# Patient Record
Sex: FEMALE | Race: WHITE | NOT HISPANIC OR LATINO | Employment: FULL TIME | URBAN - METROPOLITAN AREA
[De-identification: names, ages, dates, MRNs, and addresses within clinical notes are randomized per-mention and may not be internally consistent; named-entity substitution may affect disease eponyms.]

---

## 2019-01-07 ENCOUNTER — RECORDS - HEALTHEAST (OUTPATIENT)
Dept: LAB | Facility: HOSPITAL | Age: 24
End: 2019-01-07

## 2019-01-07 LAB
ANION GAP SERPL CALCULATED.3IONS-SCNC: 6 MMOL/L (ref 5–18)
CHLORIDE BLD-SCNC: 104 MMOL/L (ref 98–107)
CO2 SERPL-SCNC: 31 MMOL/L (ref 22–31)
LEVETIRACETAM (KEPPRA): 21.6 UG/ML (ref 6–46)
POTASSIUM BLD-SCNC: 3.9 MMOL/L (ref 3.5–5)
SODIUM SERPL-SCNC: 141 MMOL/L (ref 136–145)

## 2020-01-30 ENCOUNTER — RECORDS - HEALTHEAST (OUTPATIENT)
Dept: LAB | Facility: HOSPITAL | Age: 25
End: 2020-01-30

## 2020-01-30 LAB
ANION GAP SERPL CALCULATED.3IONS-SCNC: 7 MMOL/L (ref 5–18)
CHLORIDE BLD-SCNC: 105 MMOL/L (ref 98–107)
CO2 SERPL-SCNC: 29 MMOL/L (ref 22–31)
LEVETIRACETAM (KEPPRA): 21.5 UG/ML (ref 6–46)
POTASSIUM BLD-SCNC: 4 MMOL/L (ref 3.5–5)
SODIUM SERPL-SCNC: 141 MMOL/L (ref 136–145)

## 2021-02-22 DIAGNOSIS — G40.B09 NONINTRACTABLE JUVENILE MYOCLONIC EPILEPSY WITHOUT STATUS EPILEPTICUS (H): Primary | ICD-10-CM

## 2021-02-22 PROBLEM — E34.8 PINEAL GLAND CYST: Status: ACTIVE | Noted: 2021-02-22

## 2021-02-22 RX ORDER — FOLIC ACID 1 MG/1
2 TABLET ORAL 2 TIMES DAILY
Qty: 120 TABLET | Refills: 0 | Status: SHIPPED | OUTPATIENT
Start: 2021-02-22 | End: 2021-03-04

## 2021-02-22 RX ORDER — LEVETIRACETAM 500 MG/1
500 TABLET ORAL 2 TIMES DAILY
Qty: 60 TABLET | Refills: 0 | Status: SHIPPED | OUTPATIENT
Start: 2021-02-22 | End: 2021-03-04

## 2021-02-22 NOTE — TELEPHONE ENCOUNTER
Refill request for Keppra and folic acid  Letter mailed to patient to schedule follow up  Medication T'd for review and signature  Heather Tsai CMA on 2/22/2021 at 9:26 AM

## 2021-02-22 NOTE — LETTER
2/22/2021        RE: Nina Acostaa  [address] [address]                Dear Nina,      We recently provided you with medication refills.  Many medications require routine follow-up with your doctor.    Your prescription(s) have been refilled for 30 days so you may have time for the above noted follow-up. Please call to schedule soon so we can assure you have an appointment before your next refills are needed. If you have already made a follow up appointment, please disregard this letter.           Sincerely,        John Paul Cotton MD  [facility]     (Formerly known as [facility])

## 2021-03-03 SDOH — HEALTH STABILITY: MENTAL HEALTH: HOW OFTEN DO YOU HAVE 6 OR MORE DRINKS ON ONE OCCASION?: NOT ASKED

## 2021-03-03 SDOH — HEALTH STABILITY: MENTAL HEALTH: HOW OFTEN DO YOU HAVE A DRINK CONTAINING ALCOHOL?: MONTHLY OR LESS

## 2021-03-03 SDOH — HEALTH STABILITY: MENTAL HEALTH: HOW MANY STANDARD DRINKS CONTAINING ALCOHOL DO YOU HAVE ON A TYPICAL DAY?: 1 OR 2

## 2021-03-04 ENCOUNTER — VIRTUAL VISIT (OUTPATIENT)
Dept: NEUROLOGY | Facility: CLINIC | Age: 26
End: 2021-03-04
Payer: COMMERCIAL

## 2021-03-04 VITALS — WEIGHT: 140 LBS | BODY MASS INDEX: 22.5 KG/M2 | HEIGHT: 66 IN

## 2021-03-04 DIAGNOSIS — G40.B09 NONINTRACTABLE JUVENILE MYOCLONIC EPILEPSY WITHOUT STATUS EPILEPTICUS (H): Primary | ICD-10-CM

## 2021-03-04 PROCEDURE — 99214 OFFICE O/P EST MOD 30 MIN: CPT | Mod: GT | Performed by: PSYCHIATRY & NEUROLOGY

## 2021-03-04 RX ORDER — LEVETIRACETAM 500 MG/1
500 TABLET ORAL 2 TIMES DAILY
Qty: 180 TABLET | Refills: 3 | Status: SHIPPED | OUTPATIENT
Start: 2021-03-04 | End: 2021-05-14

## 2021-03-04 RX ORDER — FOLIC ACID 1 MG/1
2 TABLET ORAL 2 TIMES DAILY
Qty: 360 TABLET | Refills: 3 | Status: SHIPPED | OUTPATIENT
Start: 2021-03-04 | End: 2022-01-21

## 2021-03-04 RX ORDER — LEVONORGESTREL AND ETHINYL ESTRADIOL 0.1-0.02MG
KIT ORAL
COMMUNITY
Start: 2021-02-19 | End: 2022-05-09

## 2021-03-04 ASSESSMENT — MIFFLIN-ST. JEOR: SCORE: 1391.79

## 2021-03-04 NOTE — LETTER
3/4/2021         RE: Nina Nam  [address] [facility] [address]        Dear Colleague,    Thank you for referring your patient, Nina Nam, to the [facility] [place]. Please see a copy of my visit note below.    NEUROLOGY FOLLOW UP VISIT  NOTE       [facility] NEUROLOGY [place]  [address] [place] [address]  Tel: [phone]  Fax: [phone]  [email or web address]     Nina Nam,  1995, MRN [id number]  PCP: Almaz Mims, [phone]  Date: 3/4/2021     ASSESSMENT & PLAN     Diagnosis code  1. 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  She denies any seizures since her last visit.  She occasionally gets myoclonic twitches if she is tired or wakes up in the morning.  She is continuing with Keppra and folic acid.  I refilled her prescriptions, gave her 90-day supply with 3 refills.  I am checking Keppra level and electrolyte panel.  She was counseled about the risk of congenital malformation in women of childbearing age.  Regular follow-up will be in 1 year    Thank you again for this referral, please feel free to contact me if you have any questions.    John Paul Cotton MD  [facility]  (Formerly, [facility]     HISTORY OF PRESENT ILLNESS     Patient is 25 years old female with history of juvenile myoclonic epilepsy, pineal cyst who returns for yearly follow-up.  Previously she was seen in the clinic by Dr. Burrows and is here for routine yearly follow-up.  She denies any seizures since her last visit.  There is no history of unexplained loss of consciousness, tonic-clonic activity or any myoclonic twitches.  She was initially evaluated in our clinic in  when she had a seizure after she woke  "up.  MRI of the brain was normal.  EEG did not show any abnormality.  She experienced some myoclonic twitches in the past but after she started taking Keppra those twitches have subsided.  Additionally she was started on folic acid as she is in childbearing age and takes 4 mg every day.  Since her last visit she reports no further episodes of seizures but does report that if she is tired she does get twitches in upper part of her body.  During the pandemic she has remained healthy.  Her sister is nurse and got her Covid injection and she is hopeful she will get hers soon.     PROBLEM LIST   Patient Active Problem List   Diagnosis Code     Juvenile myoclonic epilepsy (H) G40.B09     Pineal gland cyst E34.8         PAST MEDICAL & SURGICAL HISTORY     Past Medical History:   Patient  has no past medical history on file.    Surgical History:  She  has no past surgical history on file.     SOCIAL HISTORY     Reviewed, and she  reports that she has never smoked. She has never used smokeless tobacco. She reports current alcohol use.     FAMILY HISTORY     Reviewed, and family history includes Cancer in her maternal grandfather; Hypertension in her mother.     ALLERGIES     No Known Allergies      REVIEW OF SYSTEMS     A 12 point review of system was performed and was negative except as outlined in the history of present illness.     HOME MEDICATIONS       Current Outpatient Medications:      folic acid (FOLVITE) 1 MG tablet, Take 2 tablets (2 mg) by mouth 2 times daily, Disp: 360 tablet, Rfl: 3     levETIRAcetam (KEPPRA) 500 MG tablet, Take 1 tablet (500 mg) by mouth 2 times daily, Disp: 180 tablet, Rfl: 3     LESSINA-28 0.1-20 MG-MCG tablet, , Disp: , Rfl:       PHYSICAL EXAM     Vital signs  Ht 1.676 m (5' 6\")   Wt 63.5 kg (140 lb)   BMI 22.60 kg/m      Weight:   140 lbs 0 oz    Patient is alert and oriented x3 no acute distress.  Vital signs are reviewed and are documented in electronic medical record.   neck supple.  " Speech normal with no dysarthria or aphasia.  Extraocular movements are intact face symmetrical.  Patient moves all 4 extremities.  Rest of exam was not possible on a video visit     DIAGNOSTIC STUDIES     PERTINENT RADIOLOGY  Following imaging studies were reviewed:     (06/16/2015 16:12 CDT MRI Report)  1. Normal Head MRI.  2. No acute infarct, mass, or hemorrhage.  3. Incidental 14 mm pineal cyst    EEG 6/30/15  IMPRESSION: This is a normal awake and drowsy EEG.       PERTINENT LABS  Following labs were reviewed:  Result Name Current Result Reference Range   Sodium Level (mmol/L)  141 1/30/2020 136 - 145   Potassium Level (mmol/L)  4.0 1/30/2020 3.5 - 5.0   CO2 Level (mmol/L)  29 1/30/2020 22 - 31   Chloride Level (mmol/L)  105 1/30/2020 98 - 107   AGAP (mmol/L)  7 1/30/2020 5 - 18   Levetiracetam (Keppra) Level (ug/mL)  21.5 1/30/2020 6.0 - 46.0         VIDEO VISIT DETAILS  Patient is being evaluated via a billable video visit.   Patient would like the video invitation sent by: [x]E-mail  []Social Recruiting   Type of service: Video Visit  Video Start Time: 9:21 AM  Video End Time (time video stopped): 9:27 AM  Originating Location (Patient Location): Patient's Home  Distant Location (provider location): [facility] [place]   Mode of Communication: Video Conference via [x]VMO Systems []Doximity     Total time spent for face to face visit, reviewing labs/imaging studies, counseling and coordination of care was: 30 Minutes spent on the date of the encounter doing chart review, review of outside records, review of test results, interpretation of tests, patient visit and documentation       This note was dictated using voice recognition software.  Any grammatical or context distortions are unintentional and inherent to the software.             Again, thank you for allowing me to participate in the care of your patient.        Sincerely,        John Paul Cotton MD

## 2021-03-04 NOTE — PROGRESS NOTES
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  She denies any seizures since her last visit.  She occasionally gets myoclonic twitches if she is tired or wakes up in the morning.  She is continuing with Keppra and folic acid.  I refilled her prescriptions, gave her 90-day supply with 3 refills.  I am checking Keppra level and electrolyte panel.  She was counseled about the risk of congenital malformation in women of childbearing age.  Regular follow-up will be in 1 year    Thank you again for this referral, please feel free to contact me if you have any questions.    John Paul Cotton MD  [facility]  (Formerly, [facility])     HISTORY OF PRESENT ILLNESS     Patient is 25 years old female with history of juvenile myoclonic epilepsy, pineal cyst who returns for yearly follow-up.  Previously she was seen in the clinic by Dr. Burrows and is here for routine yearly follow-up.  She denies any seizures since her last visit.  There is no history of unexplained loss of consciousness, tonic-clonic activity or any myoclonic twitches.  She was initially evaluated in our clinic in  when she had a seizure after she woke up.  MRI of the brain was normal.  EEG did not show any abnormality.  She experienced some myoclonic twitches in the past but after she started taking Keppra those twitches have subsided.  Additionally she was started on folic acid as she is in childbearing  "age and takes 4 mg every day.  Since her last visit she reports no further episodes of seizures but does report that if she is tired she does get twitches in upper part of her body.  During the pandemic she has remained healthy.  Her sister is nurse and got her Covid injection and she is hopeful she will get hers soon.     PROBLEM LIST   Patient Active Problem List   Diagnosis Code     Juvenile myoclonic epilepsy (H) G40.B09     Pineal gland cyst E34.8         PAST MEDICAL & SURGICAL HISTORY     Past Medical History:   Patient  has no past medical history on file.    Surgical History:  She  has no past surgical history on file.     SOCIAL HISTORY     Reviewed, and she  reports that she has never smoked. She has never used smokeless tobacco. She reports current alcohol use.     FAMILY HISTORY     Reviewed, and family history includes Cancer in her maternal grandfather; Hypertension in her mother.     ALLERGIES     No Known Allergies      REVIEW OF SYSTEMS     A 12 point review of system was performed and was negative except as outlined in the history of present illness.     HOME MEDICATIONS       Current Outpatient Medications:      folic acid (FOLVITE) 1 MG tablet, Take 2 tablets (2 mg) by mouth 2 times daily, Disp: 360 tablet, Rfl: 3     levETIRAcetam (KEPPRA) 500 MG tablet, Take 1 tablet (500 mg) by mouth 2 times daily, Disp: 180 tablet, Rfl: 3     LESSINA-28 0.1-20 MG-MCG tablet, , Disp: , Rfl:       PHYSICAL EXAM     Vital signs  Ht 1.676 m (5' 6\")   Wt 63.5 kg (140 lb)   BMI 22.60 kg/m      Weight:   140 lbs 0 oz    Patient is alert and oriented x3 no acute distress.  Vital signs are reviewed and are documented in electronic medical record.   neck supple.  Speech normal with no dysarthria or aphasia.  Extraocular movements are intact face symmetrical.  Patient moves all 4 extremities.  Rest of exam was not possible on a video visit     DIAGNOSTIC STUDIES     PERTINENT RADIOLOGY  Following imaging studies were " reviewed:     (06/16/2015 16:12 CDT MRI Report)  1. Normal Head MRI.  2. No acute infarct, mass, or hemorrhage.  3. Incidental 14 mm pineal cyst    EEG 6/30/15  IMPRESSION: This is a normal awake and drowsy EEG.       PERTINENT LABS  Following labs were reviewed:  Result Name Current Result Reference Range   Sodium Level (mmol/L)  141 1/30/2020 136 - 145   Potassium Level (mmol/L)  4.0 1/30/2020 3.5 - 5.0   CO2 Level (mmol/L)  29 1/30/2020 22 - 31   Chloride Level (mmol/L)  105 1/30/2020 98 - 107   AGAP (mmol/L)  7 1/30/2020 5 - 18   Levetiracetam (Keppra) Level (ug/mL)  21.5 1/30/2020 6.0 - 46.0         VIDEO VISIT DETAILS  Patient is being evaluated via a billable video visit.   Patient would like the video invitation sent by: [x]E-mail  []SMS   Type of service: Video Visit  Video Start Time: 9:21 AM  Video End Time (time video stopped): 9:27 AM  Originating Location (Patient Location): Patient's Home  Distant Location (provider location): [facility]rology [place]   Mode of Communication: Video Conference via [x]Flotype []Doximity     Total time spent for face to face visit, reviewing labs/imaging studies, counseling and coordination of care was: 30 Minutes spent on the date of the encounter doing chart review, review of outside records, review of test results, interpretation of tests, patient visit and documentation     This note was dictated using voice recognition software.  Any grammatical or context distortions are unintentional and inherent to the software.

## 2021-03-04 NOTE — NURSING NOTE
Chief Complaint   Patient presents with     Seizures     Annual follow up     Video Visit     Nya Tsai CMA on 3/4/2021 at 9:09 AM

## 2021-03-07 ENCOUNTER — HEALTH MAINTENANCE LETTER (OUTPATIENT)
Age: 26
End: 2021-03-07

## 2021-03-08 ENCOUNTER — TELEPHONE (OUTPATIENT)
Dept: NEUROLOGY | Facility: CLINIC | Age: 26
End: 2021-03-08

## 2021-05-14 DIAGNOSIS — G40.B09 NONINTRACTABLE JUVENILE MYOCLONIC EPILEPSY WITHOUT STATUS EPILEPTICUS (H): ICD-10-CM

## 2021-05-14 RX ORDER — LEVETIRACETAM 500 MG/1
500 TABLET ORAL 2 TIMES DAILY
Qty: 4 TABLET | Refills: 0 | Status: SHIPPED | OUTPATIENT
Start: 2021-05-14 | End: 2022-01-21

## 2021-05-14 NOTE — TELEPHONE ENCOUNTER
Pt called, she is in [place] and forgot her meds. Please send in 4 tabs of the 500 mg Keppra. She takes 1 tab bid. Send this once, to [facility] in [place]. Call if questions. [phone]

## 2021-10-11 ENCOUNTER — HEALTH MAINTENANCE LETTER (OUTPATIENT)
Age: 26
End: 2021-10-11

## 2022-01-21 DIAGNOSIS — G40.B09 NONINTRACTABLE JUVENILE MYOCLONIC EPILEPSY WITHOUT STATUS EPILEPTICUS (H): ICD-10-CM

## 2022-01-21 RX ORDER — LEVETIRACETAM 500 MG/1
TABLET ORAL
Qty: 180 TABLET | Refills: 3 | Status: SHIPPED | OUTPATIENT
Start: 2022-01-21 | End: 2022-05-09

## 2022-01-21 RX ORDER — FOLIC ACID 1 MG/1
TABLET ORAL
Qty: 360 TABLET | Refills: 3 | Status: SHIPPED | OUTPATIENT
Start: 2022-01-21 | End: 2022-05-09

## 2022-01-21 NOTE — TELEPHONE ENCOUNTER
Refill request for folic acid and keppra. Pt last seen 3/4/21 and is due for follow up around 3/2022. Will send in refills to get pt through to this appt.     Peggy Stephens RN on 1/21/2022 at 8:40 AM

## 2022-03-27 ENCOUNTER — HEALTH MAINTENANCE LETTER (OUTPATIENT)
Age: 27
End: 2022-03-27

## 2022-05-09 ENCOUNTER — LAB (OUTPATIENT)
Dept: LAB | Facility: HOSPITAL | Age: 27
End: 2022-05-09
Payer: COMMERCIAL

## 2022-05-09 ENCOUNTER — OFFICE VISIT (OUTPATIENT)
Dept: NEUROLOGY | Facility: CLINIC | Age: 27
End: 2022-05-09
Payer: COMMERCIAL

## 2022-05-09 VITALS
DIASTOLIC BLOOD PRESSURE: 88 MMHG | SYSTOLIC BLOOD PRESSURE: 125 MMHG | WEIGHT: 151 LBS | HEART RATE: 80 BPM | BODY MASS INDEX: 23.7 KG/M2 | HEIGHT: 67 IN

## 2022-05-09 DIAGNOSIS — G40.B09 NONINTRACTABLE JUVENILE MYOCLONIC EPILEPSY WITHOUT STATUS EPILEPTICUS (H): ICD-10-CM

## 2022-05-09 DIAGNOSIS — G40.B09 NONINTRACTABLE JUVENILE MYOCLONIC EPILEPSY WITHOUT STATUS EPILEPTICUS (H): Primary | ICD-10-CM

## 2022-05-09 LAB
ANION GAP SERPL CALCULATED.3IONS-SCNC: 9 MMOL/L (ref 5–18)
BUN SERPL-MCNC: 11 MG/DL (ref 8–22)
CALCIUM SERPL-MCNC: 10 MG/DL (ref 8.5–10.5)
CHLORIDE BLD-SCNC: 101 MMOL/L (ref 98–107)
CO2 SERPL-SCNC: 27 MMOL/L (ref 22–31)
CREAT SERPL-MCNC: 0.72 MG/DL (ref 0.6–1.1)
GFR SERPL CREATININE-BSD FRML MDRD: >90 ML/MIN/1.73M2
GLUCOSE BLD-MCNC: 99 MG/DL (ref 70–125)
POTASSIUM BLD-SCNC: 4.2 MMOL/L (ref 3.5–5)
SODIUM SERPL-SCNC: 137 MMOL/L (ref 136–145)

## 2022-05-09 PROCEDURE — 80048 BASIC METABOLIC PNL TOTAL CA: CPT

## 2022-05-09 PROCEDURE — 36415 COLL VENOUS BLD VENIPUNCTURE: CPT

## 2022-05-09 PROCEDURE — 99214 OFFICE O/P EST MOD 30 MIN: CPT | Performed by: PSYCHIATRY & NEUROLOGY

## 2022-05-09 PROCEDURE — 80177 DRUG SCRN QUAN LEVETIRACETAM: CPT

## 2022-05-09 RX ORDER — LEVETIRACETAM 500 MG/1
500 TABLET ORAL 2 TIMES DAILY
Qty: 180 TABLET | Refills: 3 | Status: SHIPPED | OUTPATIENT
Start: 2022-05-09 | End: 2023-04-25

## 2022-05-09 RX ORDER — FOLIC ACID 1 MG/1
2 TABLET ORAL 2 TIMES DAILY
Qty: 360 TABLET | Refills: 3 | Status: SHIPPED | OUTPATIENT
Start: 2022-05-09 | End: 2023-04-25

## 2022-05-09 NOTE — NURSING NOTE
Chief Complaint   Patient presents with     Seizures     Annual follow up      Heather Tsai CMA on 5/9/2022 at 12:44 PM

## 2022-05-09 NOTE — PROGRESS NOTES
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  Her symptoms are well controlled on current dose of Keppra and I have recommended:    1.  Continue Keppra 500 mg twice daily.  Prescription was filled and I gave her 90-day supply with 3 refills  2.  Continue folic acid 2 mg twice daily  3.  Patient was counseled about the risk of congenital malformation in women of childbearing age.  4.  Follow-up will be in 1 year.    Thank you again for this referral, please feel free to contact me if you have any questions.    John Paul Cotton MD  [facility]  (Former[facility], [facility]     HISTORY OF PRESENT ILLNESS     Patient is a pleasant 26-year-old female with history of juvenile myoclonic epilepsy, pineal cyst who returns for yearly follow-up.  She was last seen on 3/4/2021 when she reported occasional myoclonic twitches when she is tired.  She was kept on Keppra and folic acid and had blood levels checked that were normal.  Since her last visit she reports no further seizures.  She occasionally gets myoclonic twitches when she is tired.  She denies unexplained loss of consciousness, automatism or losing touch with her surrounding.    Patient was initially evaluated in our clinic in  when she had a seizure when she woke up.  MRI of the brain was normal.  EEG did not show any abnormality.  She experienced some myoclonic twitches in the past but after she was started on Keppra  "those twitches subsided.  She was also started on folic acid.  Although EEG was normal she was diagnosed with juvenile myoclonic epilepsy and had remained symptom-free.     PROBLEM LIST   Patient Active Problem List   Diagnosis Code     Juvenile myoclonic epilepsy (H) G40.B09     Pineal gland cyst E34.8         PAST MEDICAL & SURGICAL HISTORY     Past Medical History:   Patient  has no past medical history on file.    Surgical History:  She  has no past surgical history on file.     SOCIAL HISTORY     Reviewed, and she  reports that she has never smoked. She has never used smokeless tobacco. She reports current alcohol use.     FAMILY HISTORY     Reviewed, and family history includes Cancer in her maternal grandfather; Hypertension in her mother; Uterine Cancer in her maternal grandmother.     ALLERGIES     No Known Allergies      REVIEW OF SYSTEMS     A 12 point review of system was performed and was negative except as outlined in the history of present illness.     HOME MEDICATIONS     Current Outpatient Rx   Medication Sig Dispense Refill     folic acid (FOLVITE) 1 MG tablet Take 2 tablets (2 mg) by mouth 2 times daily 360 tablet 3     levETIRAcetam (KEPPRA) 500 MG tablet Take 1 tablet (500 mg) by mouth 2 times daily 180 tablet 3         PHYSICAL EXAM     Vital signs  /88 (BP Location: Right arm, Patient Position: Sitting)   Pulse 80   Ht 1.702 m (5' 7\")   Wt 68.5 kg (151 lb)   BMI 23.65 kg/m      Weight:   151 lbs 0 oz    Patient is alert and oriented x4 in no acute distress. Vital signs were reviewed and are documented in electronic medical record. Neck was supple, no carotid bruits, thyromegaly, JVD, or lymphadenopathy was noted.   NEUROLOGY EXAM:    Patient s speech was normal with no aphasia or dysarthria. Mentation, and affect were also normal.     Funduscopic exam was normal, with normal cup to disc ratio. Cranial nerves II -XII were intact.     Patient had normal mass, tone and motor strength " was 5/5 in all extremities without pronator drift.     Sensation was intact to light touch, pinprick, and vibratory sensation.     Reflexes were 1+ symmetrical with downgoing toes.     No dysmetria noted on FNF or HKS. Romberg was negative.    Gait testing was normal. Able to walk on toes/heels. Tandem walk normal.     PERTINENT DIAGNOSTIC STUDIES     Following studies were reviewed:     (06/16/2015 16:12 CDT MRI Report)  1. Normal Head MRI.  2. No acute infarct, mass, or hemorrhage.  3. Incidental 14 mm pineal cyst    EEG 6/30/15  IMPRESSION: This is a normal awake and drowsy EEG.     PERTINENT LABS  Following labs were reviewed:  No visits with results within 3 Month(s) from this visit.   Latest known visit with results is:   Records - [facility] on 01/30/2020   Component Date Value     Levetiracetam 01/30/2020 21.5      Sodium 01/30/2020 141      Potassium 01/30/2020 4.0      Carbon Dioxide (CO2) 01/30/2020 29      Chloride 01/30/2020 105      Anion Gap 01/30/2020 7          Total time spent for face to face visit, reviewing labs/imaging studies, counseling and coordination of care was: 30 Minutes spent on the date of the encounter doing chart review, review of outside records, review of test results, interpretation of tests, patient visit and documentation     This note was dictated using voice recognition software.  Any grammatical or context distortions are unintentional and inherent to the software.    Orders Placed This Encounter   Procedures     Keppra (Levetiracetam) Level     Basic metabolic panel      New Prescriptions    No medications on file     Modified Medications    Modified Medication Previous Medication    FOLIC ACID (FOLVITE) 1 MG TABLET folic acid (FOLVITE) 1 MG tablet       Take 2 tablets (2 mg) by mouth 2 times daily    TAKE 2 TABLETS BY MOUTH  TWICE DAILY    LEVETIRACETAM (KEPPRA) 500 MG TABLET levETIRAcetam (KEPPRA) 500 MG tablet       Take 1 tablet (500 mg) by mouth 2 times daily    TAKE 1  TABLET BY MOUTH  TWICE DAILY

## 2022-05-09 NOTE — LETTER
2022         RE: Nina Nam  [address] [facility] [address]        Dear Colleague,    Thank you for referring your patient, Nina Nam, to the [facility]UROLOG[facility]IN[facility] [place]. Please see a copy of my visit note below.    NEUROLOGY FOLLOW UP VISIT  NOTE       [facility]UROLOG[facility] [place]  [address] [place] [address]  Tel: [phone]  Fax: [phone]  [email or web address]     Nina Nam,  1995, MRN [id number]  PCP: Almaz Mims  Date: 2022      ASSESSMENT & PLAN     Visit Diagnosis  1. 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  Her symptoms are well controlled on current dose of Keppra and I have recommended:    1.  Continue Keppra 500 mg twice daily.  Prescription was filled and I gave her 90-day supply with 3 refills  2.  Continue folic acid 2 mg twice daily  3.  Patient was counseled about the risk of congenital malformation in women of childbearing age.  4.  Follow-up will be in 1 year.    Thank you again for this referral, please feel free to contact me if you have any questions.    John Paul Cotton MD  [facility]  (Formerly, [facility]     HISTORY OF PRESENT ILLNESS     Patient is a pleasant 26-year-old female with history of juvenile myoclonic epilepsy, pineal cyst who returns for yearly follow-up.  She was last seen on 3/4/2021 when she reported occasional myoclonic twitches when she is tired.  She was kept on Keppra and folic acid and had blood levels checked that were normal.  Since her last visit she reports no further seizures.  She occasionally gets myoclonic twitches when she is tired.  She denies unexplained loss of consciousness, automatism or losing touch with her  "surrounding.    Patient was initially evaluated in our clinic in 2014 when she had a seizure when she woke up.  MRI of the brain was normal.  EEG did not show any abnormality.  She experienced some myoclonic twitches in the past but after she was started on Keppra those twitches subsided.  She was also started on folic acid.  Although EEG was normal she was diagnosed with juvenile myoclonic epilepsy and had remained symptom-free.     PROBLEM LIST   Patient Active Problem List   Diagnosis Code     Juvenile myoclonic epilepsy (H) G40.B09     Pineal gland cyst E34.8         PAST MEDICAL & SURGICAL HISTORY     Past Medical History:   Patient  has no past medical history on file.    Surgical History:  She  has no past surgical history on file.     SOCIAL HISTORY     Reviewed, and she  reports that she has never smoked. She has never used smokeless tobacco. She reports current alcohol use.     FAMILY HISTORY     Reviewed, and family history includes Cancer in her maternal grandfather; Hypertension in her mother; Uterine Cancer in her maternal grandmother.     ALLERGIES     No Known Allergies      REVIEW OF SYSTEMS     A 12 point review of system was performed and was negative except as outlined in the history of present illness.     HOME MEDICATIONS     Current Outpatient Rx   Medication Sig Dispense Refill     folic acid (FOLVITE) 1 MG tablet Take 2 tablets (2 mg) by mouth 2 times daily 360 tablet 3     levETIRAcetam (KEPPRA) 500 MG tablet Take 1 tablet (500 mg) by mouth 2 times daily 180 tablet 3         PHYSICAL EXAM     Vital signs  /88 (BP Location: Right arm, Patient Position: Sitting)   Pulse 80   Ht 1.702 m (5' 7\")   Wt 68.5 kg (151 lb)   BMI 23.65 kg/m      Weight:   151 lbs 0 oz    Patient is alert and oriented x4 in no acute distress. Vital signs were reviewed and are documented in electronic medical record. Neck was supple, no carotid bruits, thyromegaly, JVD, or lymphadenopathy was noted. "   NEUROLOGY EXAM:    Patient s speech was normal with no aphasia or dysarthria. Mentation, and affect were also normal.     Funduscopic exam was normal, with normal cup to disc ratio. Cranial nerves II -XII were intact.     Patient had normal mass, tone and motor strength was 5/5 in all extremities without pronator drift.     Sensation was intact to light touch, pinprick, and vibratory sensation.     Reflexes were 1+ symmetrical with downgoing toes.     No dysmetria noted on FNF or HKS. Romberg was negative.    Gait testing was normal. Able to walk on toes/heels. Tandem walk normal.     PERTINENT DIAGNOSTIC STUDIES     Following studies were reviewed:     (06/16/2015 16:12 CDT MRI Report)  1. Normal Head MRI.  2. No acute infarct, mass, or hemorrhage.  3. Incidental 14 mm pineal cyst    EEG 6/30/15  IMPRESSION: This is a normal awake and drowsy EEG.     PERTINENT LABS  Following labs were reviewed:  No visits with results within 3 Month(s) from this visit.   Latest known visit with results is:   Records - [facility] on 01/30/2020   Component Date Value     Levetiracetam 01/30/2020 21.5      Sodium 01/30/2020 141      Potassium 01/30/2020 4.0      Carbon Dioxide (CO2) 01/30/2020 29      Chloride 01/30/2020 105      Anion Gap 01/30/2020 7          Total time spent for face to face visit, reviewing labs/imaging studies, counseling and coordination of care was: 30 Minutes spent on the date of the encounter doing chart review, review of outside records, review of test results, interpretation of tests, patient visit and documentation       This note was dictated using voice recognition software.  Any grammatical or context distortions are unintentional and inherent to the software.    Orders Placed This Encounter   Procedures     Keppra (Levetiracetam) Level     Basic metabolic panel      New Prescriptions    No medications on file     Modified Medications    Modified Medication Previous Medication    FOLIC ACID (FOLVITE)  1 MG TABLET folic acid (FOLVITE) 1 MG tablet       Take 2 tablets (2 mg) by mouth 2 times daily    TAKE 2 TABLETS BY MOUTH  TWICE DAILY    LEVETIRACETAM (KEPPRA) 500 MG TABLET levETIRAcetam (KEPPRA) 500 MG tablet       Take 1 tablet (500 mg) by mouth 2 times daily    TAKE 1 TABLET BY MOUTH  TWICE DAILY                     Again, thank you for allowing me to participate in the care of your patient.        Sincerely,        John Paul Cotton MD

## 2022-05-11 LAB — LEVETIRACETAM SERPL-MCNC: 15 UG/ML

## 2022-09-19 ENCOUNTER — LAB (OUTPATIENT)
Dept: LAB | Facility: CLINIC | Age: 27
End: 2022-09-19
Payer: COMMERCIAL

## 2022-09-19 DIAGNOSIS — G40.B09 NONINTRACTABLE JUVENILE MYOCLONIC EPILEPSY WITHOUT STATUS EPILEPTICUS (H): ICD-10-CM

## 2022-09-19 LAB — LEVETIRACETAM SERPL-MCNC: 5.9 ΜG/ML (ref 10–40)

## 2022-09-19 PROCEDURE — 36415 COLL VENOUS BLD VENIPUNCTURE: CPT

## 2022-09-19 PROCEDURE — 80177 DRUG SCRN QUAN LEVETIRACETAM: CPT

## 2022-09-19 PROCEDURE — 80048 BASIC METABOLIC PNL TOTAL CA: CPT

## 2022-09-20 LAB
ANION GAP SERPL CALCULATED.3IONS-SCNC: 7 MMOL/L (ref 3–14)
BUN SERPL-MCNC: 6 MG/DL (ref 7–30)
CALCIUM SERPL-MCNC: 9.1 MG/DL (ref 8.5–10.1)
CHLORIDE BLD-SCNC: 105 MMOL/L (ref 94–109)
CO2 SERPL-SCNC: 24 MMOL/L (ref 20–32)
CREAT SERPL-MCNC: 0.48 MG/DL (ref 0.52–1.04)
GFR SERPL CREATININE-BSD FRML MDRD: >90 ML/MIN/1.73M2
GLUCOSE BLD-MCNC: 88 MG/DL (ref 70–99)
POTASSIUM BLD-SCNC: 3.8 MMOL/L (ref 3.4–5.3)
SODIUM SERPL-SCNC: 136 MMOL/L (ref 133–144)

## 2022-09-20 NOTE — RESULT ENCOUNTER NOTE
Dear Nina,   Although Keppra level is low, you have remained seizure-free and I would therefore not recommend making any change in the dose.  Please continue current dose follow-up as scheduled    John Paul Cotton MD

## 2022-09-25 ENCOUNTER — HEALTH MAINTENANCE LETTER (OUTPATIENT)
Age: 27
End: 2022-09-25

## 2022-09-27 NOTE — PROGRESS NOTES
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  Her delivery date is in 2023.  She recently had a Keppra level checked and although subtherapeutic I have not made any changes in the dose as she has remained symptom-free.  She also had ultrasound done at her OB/GYN office that was normal.  I have recommended:    1.  Continue Keppra 500 mg twice daily.  Although level is low I am not making any changes  2.  Continue folic acid 2 mg twice daily  3.  Repeat Keppra level after 3 months  4.  Follow-up after delivery in 2023    Thank you again for this referral, please feel free to contact me if you have any questions.    John Paul Cotton MD  [facility]  ([facility], [facility]     HISTORY OF PRESENT ILLNESS     Patient is a casie 26-year-old female with history of juvenile myoclonic epilepsy, pineal cyst last seen on 2022 who returns for follow-up sooner than her scheduled visit as she is 13 weeks pregnant.  She had her first ultrasound and OB appointment and everything was normal.  She was told to continue on folic acid and Keppra level was checked that was low but as she had remained seizure-free no change was made.  She denies unexplained loss of consciousness, myoclonic twitches or any seizures.  She is wondering after delivery if she can nursed the baby.    Patient was initially evaluated in our clinic in  when she had a seizure when she  "woke up.  MRI of the brain was normal.  EEG did not show any abnormality.  She experienced some myoclonic twitches in the past but after she was started on Keppra those twitches subsided.  She was also started on folic acid.  Although EEG was normal she was diagnosed with juvenile myoclonic epilepsy and had remained symptom-free.     PROBLEM LIST   Patient Active Problem List   Diagnosis Code     Juvenile myoclonic epilepsy (H) G40.B09     Pineal gland cyst E34.8         PAST MEDICAL & SURGICAL HISTORY     Past Medical History:   Patient  has no past medical history on file.    Surgical History:  She  has no past surgical history on file.     SOCIAL HISTORY     Reviewed, and she  reports that she has never smoked. She has never used smokeless tobacco. She reports current alcohol use.     FAMILY HISTORY     Reviewed, and family history includes Cancer in her maternal grandfather; Hypertension in her mother; Uterine Cancer in her maternal grandmother.     ALLERGIES     No Known Allergies      REVIEW OF SYSTEMS     A 12 point review of system was performed and was negative except as outlined in the history of present illness.     HOME MEDICATIONS     Current Outpatient Rx   Medication Sig Dispense Refill     folic acid (FOLVITE) 1 MG tablet Take 2 tablets (2 mg) by mouth 2 times daily 360 tablet 3     levETIRAcetam (KEPPRA) 500 MG tablet Take 1 tablet (500 mg) by mouth 2 times daily 180 tablet 3         PHYSICAL EXAM     Vital signs  /74 (BP Location: Right arm, Patient Position: Sitting)   Pulse 61   Ht 1.702 m (5' 7\")   Wt 69.9 kg (154 lb)   BMI 24.12 kg/m      Weight:   154 lbs 0 oz    Patient is alert and oriented x4 in no acute distress. Vital signs were reviewed and are documented in electronic medical record. Neck was supple, no carotid bruits, thyromegaly, JVD, or lymphadenopathy was noted.   NEUROLOGY EXAM:    Patient s speech was normal with no aphasia or dysarthria. Mentation, and affect were also " normal.     Funduscopic exam was normal, with normal cup to disc ratio. Cranial nerves II -XII were intact.     Patient had normal mass, tone and motor strength was 5/5 in all extremities without pronator drift.     Sensation was intact to light touch, pinprick, and vibratory sensation.     Reflexes were 1+ symmetrical with downgoing toes.     No dysmetria noted on FNF or HKS. Romberg was negative.    Gait testing was normal. Able to walk on toes/heels. Tandem walk normal.     PERTINENT DIAGNOSTIC STUDIES     Following studies were reviewed:     (06/16/2015 16:12 CDT MRI Report)  1. Normal Head MRI.  2. No acute infarct, mass, or hemorrhage.  3. Incidental 14 mm pineal cyst    EEG 6/30/15  IMPRESSION: This is a normal awake and drowsy EEG.     PERTINENT LABS  Following labs were reviewed:  Lab on 09/19/2022   Component Date Value     Keppra (Levetiracetam) L* 09/19/2022 5.9 (A)     Sodium 09/19/2022 136      Potassium 09/19/2022 3.8      Chloride 09/19/2022 105      Carbon Dioxide (CO2) 09/19/2022 24      Anion Gap 09/19/2022 7      Urea Nitrogen 09/19/2022 6 (A)     Creatinine 09/19/2022 0.48 (A)     Calcium 09/19/2022 9.1      Glucose 09/19/2022 88      GFR Estimate 09/19/2022 >90          Total time spent for face to face visit, reviewing labs/imaging studies, counseling and coordination of care was: 45 Minutes spent on the date of the encounter doing chart review, review of outside records, review of test results, interpretation of tests, patient visit and documentation     This note was dictated using voice recognition software.  Any grammatical or context distortions are unintentional and inherent to the software.    No orders of the defined types were placed in this encounter.     New Prescriptions    No medications on file     Modified Medications    No medications on file

## 2022-09-28 ENCOUNTER — OFFICE VISIT (OUTPATIENT)
Dept: NEUROLOGY | Facility: CLINIC | Age: 27
End: 2022-09-28
Payer: COMMERCIAL

## 2022-09-28 VITALS
BODY MASS INDEX: 24.17 KG/M2 | HEIGHT: 67 IN | SYSTOLIC BLOOD PRESSURE: 109 MMHG | DIASTOLIC BLOOD PRESSURE: 74 MMHG | WEIGHT: 154 LBS | HEART RATE: 61 BPM

## 2022-09-28 DIAGNOSIS — G40.B09 NONINTRACTABLE JUVENILE MYOCLONIC EPILEPSY WITHOUT STATUS EPILEPTICUS (H): Primary | ICD-10-CM

## 2022-09-28 PROCEDURE — 99215 OFFICE O/P EST HI 40 MIN: CPT | Performed by: PSYCHIATRY & NEUROLOGY

## 2022-09-28 NOTE — NURSING NOTE
Chief Complaint   Patient presents with     Seizures     Currently 17 weeks pregnant     Heather Tsai CMA on 9/28/2022 at 10:50 AM

## 2022-09-28 NOTE — LETTER
2022         RE: Nina Nam  [address] [facility] [address]        Dear Colleague,    Thank you for referring your patient, Nina Nam, to the [facility]UROLOG[facility] [place]. Please see a copy of my visit note below.    NEUROLOGY FOLLOW UP VISIT  NOTE       [facility] NEUROLOGY [place]  [address] [place] [address]  Tel: [phone]  Fax: [phone]  [email or web address]     Nina Nam,  1995, MRN [id number]  PCP: Almaz Mims  Date: 2022      ASSESSMENT & PLAN     Visit Diagnosis  1. 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  Her delivery date is in 2023.  She recently had a Keppra level checked and although subtherapeutic I have not made any changes in the dose as she has remained symptom-free.  She also had ultrasound done at her OB/GYN office that was normal.  I have recommended:    1.  Continue Keppra 500 mg twice daily.  Although level is low I am not making any changes  2.  Continue folic acid 2 mg twice daily  3.  Repeat Keppra level after 3 months  4.  Follow-up after delivery in 2023    Thank you again for this referral, please feel free to contact me if you have any questions.    John Paul Cotton MD  [facility]  ([facility], [facility]     HISTORY OF PRESENT ILLNESS     Patient is a pleasant 26-year-old female with history of juvenile myoclonic epilepsy, pineal cyst last seen on 2022 who returns for follow-up sooner than her scheduled visit as she is 13 weeks pregnant.  She had her first ultrasound and OB appointment and everything was normal.  She was told to continue on folic acid and Keppra level was checked that was low but as she had remained  "seizure-free no change was made.  She denies unexplained loss of consciousness, myoclonic twitches or any seizures.  She is wondering after delivery if she can nursed the baby.    Patient was initially evaluated in our clinic in 2014 when she had a seizure when she woke up.  MRI of the brain was normal.  EEG did not show any abnormality.  She experienced some myoclonic twitches in the past but after she was started on Keppra those twitches subsided.  She was also started on folic acid.  Although EEG was normal she was diagnosed with juvenile myoclonic epilepsy and had remained symptom-free.     PROBLEM LIST   Patient Active Problem List   Diagnosis Code     Juvenile myoclonic epilepsy (H) G40.B09     Pineal gland cyst E34.8         PAST MEDICAL & SURGICAL HISTORY     Past Medical History:   Patient  has no past medical history on file.    Surgical History:  She  has no past surgical history on file.     SOCIAL HISTORY     Reviewed, and she  reports that she has never smoked. She has never used smokeless tobacco. She reports current alcohol use.     FAMILY HISTORY     Reviewed, and family history includes Cancer in her maternal grandfather; Hypertension in her mother; Uterine Cancer in her maternal grandmother.     ALLERGIES     No Known Allergies      REVIEW OF SYSTEMS     A 12 point review of system was performed and was negative except as outlined in the history of present illness.     HOME MEDICATIONS     Current Outpatient Rx   Medication Sig Dispense Refill     folic acid (FOLVITE) 1 MG tablet Take 2 tablets (2 mg) by mouth 2 times daily 360 tablet 3     levETIRAcetam (KEPPRA) 500 MG tablet Take 1 tablet (500 mg) by mouth 2 times daily 180 tablet 3         PHYSICAL EXAM     Vital signs  /74 (BP Location: Right arm, Patient Position: Sitting)   Pulse 61   Ht 1.702 m (5' 7\")   Wt 69.9 kg (154 lb)   BMI 24.12 kg/m      Weight:   154 lbs 0 oz    Patient is alert and oriented x4 in no acute distress. Vital " signs were reviewed and are documented in electronic medical record. Neck was supple, no carotid bruits, thyromegaly, JVD, or lymphadenopathy was noted.   NEUROLOGY EXAM:    Patient s speech was normal with no aphasia or dysarthria. Mentation, and affect were also normal.     Funduscopic exam was normal, with normal cup to disc ratio. Cranial nerves II -XII were intact.     Patient had normal mass, tone and motor strength was 5/5 in all extremities without pronator drift.     Sensation was intact to light touch, pinprick, and vibratory sensation.     Reflexes were 1+ symmetrical with downgoing toes.     No dysmetria noted on FNF or HKS. Romberg was negative.    Gait testing was normal. Able to walk on toes/heels. Tandem walk normal.     PERTINENT DIAGNOSTIC STUDIES     Following studies were reviewed:     (06/16/2015 16:12 CDT MRI Report)  1. Normal Head MRI.  2. No acute infarct, mass, or hemorrhage.  3. Incidental 14 mm pineal cyst    EEG 6/30/15  IMPRESSION: This is a normal awake and drowsy EEG.     PERTINENT LABS  Following labs were reviewed:  Lab on 09/19/2022   Component Date Value     Keppra (Levetiracetam) L* 09/19/2022 5.9 (A)     Sodium 09/19/2022 136      Potassium 09/19/2022 3.8      Chloride 09/19/2022 105      Carbon Dioxide (CO2) 09/19/2022 24      Anion Gap 09/19/2022 7      Urea Nitrogen 09/19/2022 6 (A)     Creatinine 09/19/2022 0.48 (A)     Calcium 09/19/2022 9.1      Glucose 09/19/2022 88      GFR Estimate 09/19/2022 >90          Total time spent for face to face visit, reviewing labs/imaging studies, counseling and coordination of care was: 45 Minutes spent on the date of the encounter doing chart review, review of outside records, review of test results, interpretation of tests, patient visit and documentation       This note was dictated using voice recognition software.  Any grammatical or context distortions are unintentional and inherent to the software.    No orders of the defined types  were placed in this encounter.     New Prescriptions    No medications on file     Modified Medications    No medications on file                     Again, thank you for allowing me to participate in the care of your patient.        Sincerely,        John Paul Cotton MD

## 2022-12-16 ENCOUNTER — LAB (OUTPATIENT)
Dept: LAB | Facility: CLINIC | Age: 27
End: 2022-12-16
Payer: COMMERCIAL

## 2022-12-16 DIAGNOSIS — G40.B09 NONINTRACTABLE JUVENILE MYOCLONIC EPILEPSY WITHOUT STATUS EPILEPTICUS (H): ICD-10-CM

## 2022-12-16 LAB — LEVETIRACETAM SERPL-MCNC: 13.2 ΜG/ML (ref 10–40)

## 2022-12-16 PROCEDURE — 36415 COLL VENOUS BLD VENIPUNCTURE: CPT

## 2022-12-16 PROCEDURE — 80177 DRUG SCRN QUAN LEVETIRACETAM: CPT

## 2023-04-23 NOTE — PROGRESS NOTES
NEUROLOGY FOLLOW UP VISIT  NOTE       [facility] NEUROLOGY [place]  [address] [place] [address]  Tel: [phone]  Fax: [phone]  [email or web address]     Nina Nam,  1995, MRN [id number]  PCP: Almaz Mims  Date: 2023      ASSESSMENT & PLAN     Visit Diagnosis  1. 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 Pineal gland cyst     Juvenile myoclonic epilepsy  Pleasant 27-year-old female with juvenile myoclonic epilepsy, pineal cyst who returns for follow-up.  She denies any seizures since her last visit.  I have recommended:    1.  Continue Keppra 500 mg twice daily.  Prescriptions refilled for next year  2.  She was counseled about the risk of congenital malformation in women of childbearing age and also was continued on folic acid 2 mg twice daily  3.  Check Keppra level and electrolyte panel  4.  Part of work-up for her seizures included MRI brain that in the past showed a pineal cyst and I have recommended repeating MRI to rule out any change  5.  Follow-up in 1 year    Thank you again for this referral, please feel free to contact me if you have any questions.    John Paul Cotton MD  [facility]  (Former[facility], [facility]     HISTORY OF PRESENT ILLNESS     Patient is a 27-year-old female with history of juvenile myoclonic epilepsy, pineal cyst last seen on 2022 who returns for follow-up.  During her last visit she was pregnant and was scheduled to deliver in 2023.  Although her Keppra level was subtherapeutic I did not make any change in the dose of anticonvulsant.  She also had an ultrasound by her OB/GYN that was normal.  She was continued on Keppra and folic acid.  She denies any seizures since her last visit there is no history of unexplained loss of consciousness.    Patient was initially evaluated in our clinic in  when she had a seizure when she  "woke up.  MRI of the brain was normal.  EEG did not show any abnormality.  She experienced some myoclonic twitches in the past but after she was started on Keppra those twitches subsided.  She was also started on folic acid.  Although EEG was normal she was diagnosed with juvenile myoclonic epilepsy and had remained symptom-free.     PROBLEM LIST   Patient Active Problem List   Diagnosis Code     Juvenile myoclonic epilepsy (H) G40.B09     Pineal gland cyst E34.8         PAST MEDICAL & SURGICAL HISTORY     Past Medical History:   Patient  has no past medical history on file.    Surgical History:  She  has no past surgical history on file.     SOCIAL HISTORY     Reviewed, and she  reports that she has never smoked. She has never used smokeless tobacco. She reports current alcohol use.     FAMILY HISTORY     Reviewed, and family history includes Cancer in her maternal grandfather; Hypertension in her mother; Uterine Cancer in her maternal grandmother.     ALLERGIES     No Known Allergies      REVIEW OF SYSTEMS     A 12 point review of system was performed and was negative except as outlined in the history of present illness.     HOME MEDICATIONS     Current Outpatient Rx   Medication Sig Dispense Refill     folic acid (FOLVITE) 1 MG tablet Take 2 tablets (2 mg) by mouth 2 times daily 360 tablet 3     levETIRAcetam (KEPPRA) 500 MG tablet Take 1 tablet (500 mg) by mouth 2 times daily 180 tablet 3         PHYSICAL EXAM     Vital signs  /72 (BP Location: Right arm, Patient Position: Sitting)   Pulse 69   Ht 1.702 m (5' 7\")   Wt 70.8 kg (156 lb)   BMI 24.43 kg/m      Weight:   156 lbs 0 oz    Patient is alert and oriented x4 in no acute distress. Vital signs were reviewed and are documented in electronic medical record. Neck was supple, no carotid bruits, thyromegaly, JVD, or lymphadenopathy was noted.   NEUROLOGY EXAM:    Patient s speech was normal with no aphasia or dysarthria. Mentation, and affect were also " normal.     Funduscopic exam was normal, with normal cup to disc ratio. Cranial nerves II -XII were intact.     Patient had normal mass, tone and motor strength was 5/5 in all extremities without pronator drift.     Sensation was intact to light touch, pinprick, and vibratory sensation.     Reflexes were 1+ symmetrical with downgoing toes.     No dysmetria noted on FNF or HKS. Romberg was negative.    Gait testing was normal. Able to walk on toes/heels. Tandem walk normal.     PERTINENT DIAGNOSTIC STUDIES     Following studies were reviewed:     (06/16/2015 16:12 CDT MRI Report)  1. Normal Head MRI.  2. No acute infarct, mass, or hemorrhage.  3. Incidental 14 mm pineal cyst    EEG 6/30/15  This is a normal awake and drowsy EEG.     PERTINENT LABS  Following labs were reviewed:  No visits with results within 3 Month(s) from this visit.   Latest known visit with results is:   Lab on 12/16/2022   Component Date Value     Keppra (Levetiracetam) L* 12/16/2022 13.2          Total time spent for face to face visit, reviewing labs/imaging studies, counseling and coordination of care was: 30 Minutes spent on the date of the encounter doing chart review, review of outside records, review of test results, interpretation of tests, patient visit and documentation       This note was dictated using voice recognition software.  Any grammatical or context distortions are unintentional and inherent to the software.    Orders Placed This Encounter   Procedures     MR Brain w/o & w Contrast     Keppra (Levetiracetam) Level     Electrolyte panel      New Prescriptions    No medications on file     Modified Medications    Modified Medication Previous Medication    FOLIC ACID (FOLVITE) 1 MG TABLET folic acid (FOLVITE) 1 MG tablet       Take 2 tablets (2 mg) by mouth 2 times daily    Take 2 tablets (2 mg) by mouth 2 times daily    LEVETIRACETAM (KEPPRA) 500 MG TABLET levETIRAcetam (KEPPRA) 500 MG tablet       Take 1 tablet (500 mg) by  mouth 2 times daily    Take 1 tablet (500 mg) by mouth 2 times daily

## 2023-04-25 ENCOUNTER — OFFICE VISIT (OUTPATIENT)
Dept: NEUROLOGY | Facility: CLINIC | Age: 28
End: 2023-04-25
Payer: COMMERCIAL

## 2023-04-25 ENCOUNTER — LAB (OUTPATIENT)
Dept: LAB | Facility: HOSPITAL | Age: 28
End: 2023-04-25
Payer: COMMERCIAL

## 2023-04-25 VITALS
WEIGHT: 156 LBS | HEIGHT: 67 IN | SYSTOLIC BLOOD PRESSURE: 110 MMHG | HEART RATE: 69 BPM | DIASTOLIC BLOOD PRESSURE: 72 MMHG | BODY MASS INDEX: 24.48 KG/M2

## 2023-04-25 DIAGNOSIS — E34.8 PINEAL GLAND CYST: ICD-10-CM

## 2023-04-25 DIAGNOSIS — G40.B09 NONINTRACTABLE JUVENILE MYOCLONIC EPILEPSY WITHOUT STATUS EPILEPTICUS (H): ICD-10-CM

## 2023-04-25 DIAGNOSIS — G40.B09 NONINTRACTABLE JUVENILE MYOCLONIC EPILEPSY WITHOUT STATUS EPILEPTICUS (H): Primary | ICD-10-CM

## 2023-04-25 LAB
ANION GAP SERPL CALCULATED.3IONS-SCNC: 10 MMOL/L (ref 7–15)
CHLORIDE SERPL-SCNC: 103 MMOL/L (ref 98–107)
DEPRECATED HCO3 PLAS-SCNC: 27 MMOL/L (ref 22–29)
POTASSIUM SERPL-SCNC: 4.1 MMOL/L (ref 3.4–5.3)
SODIUM SERPL-SCNC: 140 MMOL/L (ref 136–145)

## 2023-04-25 PROCEDURE — 80051 ELECTROLYTE PANEL: CPT

## 2023-04-25 PROCEDURE — 36415 COLL VENOUS BLD VENIPUNCTURE: CPT

## 2023-04-25 PROCEDURE — 80177 DRUG SCRN QUAN LEVETIRACETAM: CPT

## 2023-04-25 PROCEDURE — 99214 OFFICE O/P EST MOD 30 MIN: CPT | Performed by: PSYCHIATRY & NEUROLOGY

## 2023-04-25 RX ORDER — FOLIC ACID 1 MG/1
2 TABLET ORAL 2 TIMES DAILY
Qty: 360 TABLET | Refills: 3 | Status: SHIPPED | OUTPATIENT
Start: 2023-04-25 | End: 2024-03-06

## 2023-04-25 RX ORDER — LEVETIRACETAM 500 MG/1
500 TABLET ORAL 2 TIMES DAILY
Qty: 180 TABLET | Refills: 3 | Status: SHIPPED | OUTPATIENT
Start: 2023-04-25 | End: 2024-03-06

## 2023-04-25 NOTE — NURSING NOTE
Chief Complaint   Patient presents with     Seizures     Annual follow up     Heather Tsai CMA on 4/25/2023 at 1:04 PM

## 2023-04-25 NOTE — LETTER
2023         RE: Nina Nam  [address] [facility] [address]        Dear Colleague,    Thank you for referring your patient, Nina Nam, to the [facility] NEUROLOG[facility]IC [place]. Please see a copy of my visit note below.    NEUROLOGY FOLLOW UP VISIT  NOTE       [facility] NEUROLOGY [place]  [address] [place] [address]  Tel: [phone]  Fax: [phone]  [email or web address]     Nina Nam,  1995, MRN [id number]  PCP: Almaz Mims  Date: 2023      ASSESSMENT & PLAN     Visit Diagnosis  1. 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 Pineal gland cyst     Juvenile myoclonic epilepsy  Pleasant 27-year-old female with juvenile myoclonic epilepsy, pineal cyst who returns for follow-up.  She denies any seizures since her last visit.  I have recommended:    1.  Continue Keppra 500 mg twice daily.  Prescriptions refilled for next year  2.  She was counseled about the risk of congenital malformation in women of childbearing age and also was continued on folic acid 2 mg twice daily  3.  Check Keppra level and electrolyte panel  4.  Part of work-up for her seizures included MRI brain that in the past showed a pineal cyst and I have recommended repeating MRI to rule out any change  5.  Follow-up in 1 year    Thank you again for this referral, please feel free to contact me if you have any questions.    John Paul Cotton MD  [facility]  ([facility], [facility]     HISTORY OF PRESENT ILLNESS     Patient is a 27-year-old female with history of juvenile myoclonic epilepsy, pineal cyst last seen on 2022 who returns for follow-up.  During her last visit she was pregnant and was scheduled to deliver in 2023.  Although her Keppra level was subtherapeutic I did not make any change in the dose of anticonvulsant.  She also had an ultrasound by her OB/GYN  "that was normal.  She was continued on Keppra and folic acid.  She denies any seizures since her last visit there is no history of unexplained loss of consciousness.    Patient was initially evaluated in our clinic in 2014 when she had a seizure when she woke up.  MRI of the brain was normal.  EEG did not show any abnormality.  She experienced some myoclonic twitches in the past but after she was started on Keppra those twitches subsided.  She was also started on folic acid.  Although EEG was normal she was diagnosed with juvenile myoclonic epilepsy and had remained symptom-free.     PROBLEM LIST   Patient Active Problem List   Diagnosis Code     Juvenile myoclonic epilepsy (H) G40.B09     Pineal gland cyst E34.8         PAST MEDICAL & SURGICAL HISTORY     Past Medical History:   Patient  has no past medical history on file.    Surgical History:  She  has no past surgical history on file.     SOCIAL HISTORY     Reviewed, and she  reports that she has never smoked. She has never used smokeless tobacco. She reports current alcohol use.     FAMILY HISTORY     Reviewed, and family history includes Cancer in her maternal grandfather; Hypertension in her mother; Uterine Cancer in her maternal grandmother.     ALLERGIES     No Known Allergies      REVIEW OF SYSTEMS     A 12 point review of system was performed and was negative except as outlined in the history of present illness.     HOME MEDICATIONS     Current Outpatient Rx   Medication Sig Dispense Refill     folic acid (FOLVITE) 1 MG tablet Take 2 tablets (2 mg) by mouth 2 times daily 360 tablet 3     levETIRAcetam (KEPPRA) 500 MG tablet Take 1 tablet (500 mg) by mouth 2 times daily 180 tablet 3         PHYSICAL EXAM     Vital signs  /72 (BP Location: Right arm, Patient Position: Sitting)   Pulse 69   Ht 1.702 m (5' 7\")   Wt 70.8 kg (156 lb)   BMI 24.43 kg/m      Weight:   156 lbs 0 oz    Patient is alert and oriented x4 in no acute distress. Vital signs were " reviewed and are documented in electronic medical record. Neck was supple, no carotid bruits, thyromegaly, JVD, or lymphadenopathy was noted.   NEUROLOGY EXAM:    Patient s speech was normal with no aphasia or dysarthria. Mentation, and affect were also normal.     Funduscopic exam was normal, with normal cup to disc ratio. Cranial nerves II -XII were intact.     Patient had normal mass, tone and motor strength was 5/5 in all extremities without pronator drift.     Sensation was intact to light touch, pinprick, and vibratory sensation.     Reflexes were 1+ symmetrical with downgoing toes.     No dysmetria noted on FNF or HKS. Romberg was negative.    Gait testing was normal. Able to walk on toes/heels. Tandem walk normal.     PERTINENT DIAGNOSTIC STUDIES     Following studies were reviewed:     (06/16/2015 16:12 CDT MRI Report)  1. Normal Head MRI.  2. No acute infarct, mass, or hemorrhage.  3. Incidental 14 mm pineal cyst    EEG 6/30/15  This is a normal awake and drowsy EEG.     PERTINENT LABS  Following labs were reviewed:  No visits with results within 3 Month(s) from this visit.   Latest known visit with results is:   Lab on 12/16/2022   Component Date Value     Keppra (Levetiracetam) L* 12/16/2022 13.2          Total time spent for face to face visit, reviewing labs/imaging studies, counseling and coordination of care was: 30 Minutes spent on the date of the encounter doing chart review, review of outside records, review of test results, interpretation of tests, patient visit and documentation       This note was dictated using voice recognition software.  Any grammatical or context distortions are unintentional and inherent to the software.    Orders Placed This Encounter   Procedures     MR Brain w/o & w Contrast     Keppra (Levetiracetam) Level     Electrolyte panel      New Prescriptions    No medications on file     Modified Medications    Modified Medication Previous Medication    FOLIC ACID (FOLVITE) 1  MG TABLET folic acid (FOLVITE) 1 MG tablet       Take 2 tablets (2 mg) by mouth 2 times daily    Take 2 tablets (2 mg) by mouth 2 times daily    LEVETIRACETAM (KEPPRA) 500 MG TABLET levETIRAcetam (KEPPRA) 500 MG tablet       Take 1 tablet (500 mg) by mouth 2 times daily    Take 1 tablet (500 mg) by mouth 2 times daily                     Again, thank you for allowing me to participate in the care of your patient.        Sincerely,        John Paul Cotton MD

## 2023-04-26 LAB — LEVETIRACETAM SERPL-MCNC: 13.2 ΜG/ML (ref 10–40)

## 2023-05-09 ENCOUNTER — HOSPITAL ENCOUNTER (OUTPATIENT)
Dept: MRI IMAGING | Facility: HOSPITAL | Age: 28
Discharge: HOME | End: 2023-05-09
Attending: PSYCHIATRY & NEUROLOGY | Admitting: PSYCHIATRY & NEUROLOGY
Payer: COMMERCIAL

## 2023-05-09 DIAGNOSIS — E34.8 PINEAL GLAND CYST: ICD-10-CM

## 2023-05-09 DIAGNOSIS — G40.B09 NONINTRACTABLE JUVENILE MYOCLONIC EPILEPSY WITHOUT STATUS EPILEPTICUS (H): ICD-10-CM

## 2023-05-09 PROCEDURE — 70553 MRI BRAIN STEM W/O & W/DYE: CPT

## 2023-05-09 PROCEDURE — 255N000002 HC RX 255 OP 636: Performed by: PSYCHIATRY & NEUROLOGY

## 2023-05-09 PROCEDURE — A9585 GADOBUTROL INJECTION: HCPCS | Performed by: PSYCHIATRY & NEUROLOGY

## 2023-05-09 RX ORDER — GADOBUTROL 604.72 MG/ML
0.1 INJECTION INTRAVENOUS ONCE
Status: COMPLETED | OUTPATIENT
Start: 2023-05-09 | End: 2023-05-09

## 2023-05-09 RX ADMIN — GADOBUTROL 7 ML: 604.72 INJECTION INTRAVENOUS at 11:09

## 2023-10-14 ENCOUNTER — HEALTH MAINTENANCE LETTER (OUTPATIENT)
Age: 28
End: 2023-10-14

## 2024-02-03 ENCOUNTER — OFFICE VISIT (OUTPATIENT)
Dept: URGENT CARE | Facility: URGENT CARE | Age: 29
End: 2024-02-03
Payer: COMMERCIAL

## 2024-02-03 VITALS
DIASTOLIC BLOOD PRESSURE: 81 MMHG | HEART RATE: 79 BPM | BODY MASS INDEX: 24.28 KG/M2 | WEIGHT: 155 LBS | OXYGEN SATURATION: 98 % | SYSTOLIC BLOOD PRESSURE: 121 MMHG | TEMPERATURE: 98.5 F

## 2024-02-03 DIAGNOSIS — L03.012 PARONYCHIA OF LEFT RING FINGER: Primary | ICD-10-CM

## 2024-02-03 PROCEDURE — 99203 OFFICE O/P NEW LOW 30 MIN: CPT | Performed by: FAMILY MEDICINE

## 2024-02-03 RX ORDER — CEPHALEXIN 500 MG/1
500 CAPSULE ORAL 3 TIMES DAILY
Qty: 21 CAPSULE | Refills: 0 | Status: SHIPPED | OUTPATIENT
Start: 2024-02-03 | End: 2024-02-10

## 2024-02-03 NOTE — PROGRESS NOTES
Chief Complaint   Patient presents with    Nail Problem     Infected ring hangnail        Nina was seen today for nail problem.    Diagnoses and all orders for this visit:    Paronychia of left ring finger  -     cephALEXin (KEFLEX) 500 MG capsule; Take 1 capsule (500 mg) by mouth 3 times daily for 7 days      Suggested to continue doing warm soaks     Follow up if  symptoms fail to improve or worsens   Pt understood and agreed with plan     SUBJECTIVE:  Nina Nam is a 28 year old female who presents complaining of left fourth finger pain.  She has noted some redness and swelling along the cuticle margin.  Symptoms began 3-4 days ago.   Severity: moderate.  She denies any trauma to the area.  No fevers or chills noted.  No migration of redness or swelling proximally.    No past medical history on file.  Current Outpatient Medications   Medication Sig Dispense Refill    cephALEXin (KEFLEX) 500 MG capsule Take 1 capsule (500 mg) by mouth 3 times daily for 7 days 21 capsule 0    folic acid (FOLVITE) 1 MG tablet Take 2 tablets (2 mg) by mouth 2 times daily 360 tablet 3    levETIRAcetam (KEPPRA) 500 MG tablet Take 1 tablet (500 mg) by mouth 2 times daily 180 tablet 3     Social History     Tobacco Use    Smoking status: Never    Smokeless tobacco: Never   Substance Use Topics    Alcohol use: Yes     Comment: Social       ROS:  Review of Systems  10 point ROS of systems including Constitutional, Eyes, Respiratory, Cardiovascular, Gastroenterology, Genitourinary,  Muscularskeletal, Psychiatric were all negative except for pertinent positives noted in my HPI         OBJECTIVE:  /81   Pulse 79   Temp 98.5  F (36.9  C) (Tympanic)   Wt 70.3 kg (155 lb)   SpO2 98%   BMI 24.28 kg/m    Hand exam:  examination of fourth finger reveals paronychia with redness, tenderness and swelling along distal finger.      ASSESSMENT:   Paronychia    PLAN:  See orders in epic    Procedure Note:     The finger was first soaked in  warm, soapy water. The distal finger was prepped with alcohol and under clean conditions, abscess was punctured with a 18 gauge needle no  Purulent fluid was drained.  No complications.  Area was then bandaged. Digital block was not used.

## 2024-03-06 DIAGNOSIS — G40.B09 NONINTRACTABLE JUVENILE MYOCLONIC EPILEPSY WITHOUT STATUS EPILEPTICUS (H): ICD-10-CM

## 2024-03-06 RX ORDER — LEVETIRACETAM 500 MG/1
500 TABLET ORAL 2 TIMES DAILY
Qty: 180 TABLET | Refills: 0 | Status: SHIPPED | OUTPATIENT
Start: 2024-03-06 | End: 2024-04-29

## 2024-03-06 RX ORDER — FOLIC ACID 1 MG/1
2000 TABLET ORAL 2 TIMES DAILY
Qty: 360 TABLET | Refills: 0 | Status: SHIPPED | OUTPATIENT
Start: 2024-03-06 | End: 2024-09-24

## 2024-03-06 NOTE — TELEPHONE ENCOUNTER
Refill request for: levETIRAcetam (KEPPRA) 500 MG tablet    Directions: Take 1 tablet (500 mg) by mouth 2 times daily     Refill request for: folic acid (FOLVITE) 1 MG tablet    Directions: Take 2 tablets (2 mg) by mouth 2 times daily     LOV: 4/25/23  NOV: 4/29/24    90 day supply with 0 refills Medication T'd for review and signature  Heather Tsai CMA on 3/6/2024 at 3:27 PM  [facility]     
None

## 2024-04-24 NOTE — PROGRESS NOTES
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  Her seizures have remained well-controlled.  She denies any early morning myoclonic twitches.  I have recommended:    1.  Continue Keppra 500 mg twice daily.  Prescriptions were filled.  Additionally she takes folic acid  2.  Check Keppra level and basic metabolic panel  3.  Follow-up in 1 year    Thank you again for this referral, please feel free to contact me if you have any questions.    John Paul Cotton MD  [facility] NEUROLOGY, [place]     HISTORY OF PRESENT ILLNESS     Patient is a pleasant 28-year-old female with history of juvenile myoclonic epilepsy, pineal cyst last seen on 2023 who returns for follow-up.  She was pregnant when seen last and delivered a healthy girl.  During her last visit she was continued on Keppra and Keppra level was checked that was therapeutic.  Since her last visit she denies any seizures, myoclonic twitches or unexplained loss of consciousness    Patient was initially evaluated in our clinic in  when she had a seizure when she woke up.  MRI of the brain was normal.  EEG did not show any abnormality.  She experienced some myoclonic twitches in the past but after she was started on Keppra those twitches subsided.  She was also started on folic acid.  Although EEG was normal she was diagnosed with juvenile myoclonic epilepsy and had remained symptom-free.     PROBLEM LIST   Patient  Active Problem List   Diagnosis    Juvenile myoclonic epilepsy (H)    Pineal gland cyst         PAST MEDICAL & SURGICAL HISTORY     Past Medical History:   Patient  has no past medical history on file.    Surgical History:  She  has no past surgical history on file.     SOCIAL HISTORY     Reviewed, and she  reports that she has never smoked. She has never used smokeless tobacco. She reports current alcohol use.     FAMILY HISTORY     Reviewed, and family history includes Cancer in her maternal grandfather; Hypertension in her mother; Uterine Cancer in her maternal grandmother.     ALLERGIES     No Known Allergies      REVIEW OF SYSTEMS     A 12 point review of system was performed and was negative except as outlined in the history of present illness.     HOME MEDICATIONS     Current Outpatient Rx   Medication Sig Dispense Refill    folic acid (FOLVITE) 1 MG tablet TAKE 2 TABLETS BY MOUTH TWICE  DAILY 360 tablet 0    levETIRAcetam (KEPPRA) 500 MG tablet TAKE 1 TABLET BY MOUTH TWICE  DAILY 180 tablet 0         PHYSICAL EXAM     Vital signs  /80   Pulse 68   Wt 69.9 kg (154 lb)   BMI 24.12 kg/m      Weight:   154 lbs 0 oz    Patient is alert and oriented x4 in no acute distress. Vital signs were reviewed and are documented in electronic medical record. Neck was supple, no carotid bruits, thyromegaly, JVD, or lymphadenopathy was noted.   NEUROLOGY EXAM:   Patient s speech was normal with no aphasia or dysarthria. Mentation, and affect were also normal.    Funduscopic exam was normal, with normal cup to disc ratio. Cranial nerves II -XII were intact.    Patient had normal mass, tone and motor strength was 5/5 in all extremities without pronator drift.    Sensation was intact to light touch, pinprick, and vibratory sensation.    Reflexes were 1+ symmetrical with downgoing toes.    No dysmetria noted on FNF or HKS. Romberg was negative.   Gait testing was normal. Able to walk on toes/heels. Tandem walk normal.      PERTINENT DIAGNOSTIC STUDIES     Following studies were reviewed:     (06/16/2015 16:12 CDT MRI Report)  1. Normal Head MRI.  2. No acute infarct, mass, or hemorrhage.  3. Incidental 14 mm pineal cyst    EEG 6/30/15  This is a normal awake and drowsy EEG.      PERTINENT LABS  Following labs were reviewed:  No visits with results within 3 Month(s) from this visit.   Latest known visit with results is:   Lab on 04/25/2023   Component Date Value Ref Range Status    Keppra (Levetiracetam) Level 04/25/2023 13.2  10.0 - 40.0  g/mL Final    Sodium 04/25/2023 140  136 - 145 mmol/L Final    Potassium 04/25/2023 4.1  3.4 - 5.3 mmol/L Final    Chloride 04/25/2023 103  98 - 107 mmol/L Final    Carbon Dioxide (CO2) 04/25/2023 27  22 - 29 mmol/L Final    Anion Gap 04/25/2023 10  7 - 15 mmol/L Final         Total time spent for face to face visit, reviewing labs/imaging studies, counseling and coordination of care was: 30 Minutes spent on the date of the encounter doing chart review, review of outside records, review of test results, interpretation of tests, patient visit, and documentation     The longitudinal plan of care for the diagnosis(es)/condition(s) as documented were addressed during this visit. Due to the added complexity in care, I will continue to support Nina in the subsequent management and with ongoing continuity of care.    This note was dictated using voice recognition software.  Any grammatical or context distortions are unintentional and inherent to the software.    No orders of the defined types were placed in this encounter.     New Prescriptions    No medications on file     Modified Medications    No medications on file

## 2024-04-29 ENCOUNTER — LAB (OUTPATIENT)
Dept: LAB | Facility: HOSPITAL | Age: 29
End: 2024-04-29
Payer: COMMERCIAL

## 2024-04-29 ENCOUNTER — OFFICE VISIT (OUTPATIENT)
Dept: NEUROLOGY | Facility: CLINIC | Age: 29
End: 2024-04-29
Payer: COMMERCIAL

## 2024-04-29 VITALS
HEART RATE: 68 BPM | WEIGHT: 154 LBS | BODY MASS INDEX: 24.12 KG/M2 | DIASTOLIC BLOOD PRESSURE: 80 MMHG | SYSTOLIC BLOOD PRESSURE: 116 MMHG

## 2024-04-29 DIAGNOSIS — G40.B09 NONINTRACTABLE JUVENILE MYOCLONIC EPILEPSY WITHOUT STATUS EPILEPTICUS (H): Primary | ICD-10-CM

## 2024-04-29 DIAGNOSIS — G40.B09 NONINTRACTABLE JUVENILE MYOCLONIC EPILEPSY WITHOUT STATUS EPILEPTICUS (H): ICD-10-CM

## 2024-04-29 LAB
ANION GAP SERPL CALCULATED.3IONS-SCNC: 11 MMOL/L (ref 7–15)
BUN SERPL-MCNC: 6.8 MG/DL (ref 6–20)
CALCIUM SERPL-MCNC: 9.2 MG/DL (ref 8.6–10)
CHLORIDE SERPL-SCNC: 102 MMOL/L (ref 98–107)
CREAT SERPL-MCNC: 0.72 MG/DL (ref 0.51–0.95)
DEPRECATED HCO3 PLAS-SCNC: 28 MMOL/L (ref 22–29)
EGFRCR SERPLBLD CKD-EPI 2021: >90 ML/MIN/1.73M2
GLUCOSE SERPL-MCNC: 86 MG/DL (ref 70–99)
LEVETIRACETAM SERPL-MCNC: 17.1 ΜG/ML (ref 10–40)
POTASSIUM SERPL-SCNC: 4.3 MMOL/L (ref 3.4–5.3)
SODIUM SERPL-SCNC: 141 MMOL/L (ref 135–145)

## 2024-04-29 PROCEDURE — G2211 COMPLEX E/M VISIT ADD ON: HCPCS | Performed by: PSYCHIATRY & NEUROLOGY

## 2024-04-29 PROCEDURE — 99214 OFFICE O/P EST MOD 30 MIN: CPT | Performed by: PSYCHIATRY & NEUROLOGY

## 2024-04-29 PROCEDURE — 36415 COLL VENOUS BLD VENIPUNCTURE: CPT

## 2024-04-29 PROCEDURE — 80177 DRUG SCRN QUAN LEVETIRACETAM: CPT

## 2024-04-29 PROCEDURE — 82374 ASSAY BLOOD CARBON DIOXIDE: CPT

## 2024-04-29 RX ORDER — LEVETIRACETAM 500 MG/1
500 TABLET ORAL 2 TIMES DAILY
Qty: 180 TABLET | Refills: 0 | Status: SHIPPED | OUTPATIENT
Start: 2024-04-29 | End: 2024-07-05

## 2024-04-29 NOTE — LETTER
2024         RE: Nina Nam  [address] [facility] [address]        Dear Colleague,    Thank you for referring your patient, Nina Nam, to the [facility] [place]. Please see a copy of my visit note below.    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  Her seizures have remained well-controlled.  She denies any early morning myoclonic twitches.  I have recommended:    1.  Continue Keppra 500 mg twice daily.  Prescriptions were filled.  Additionally she takes folic acid  2.  Check Keppra level and basic metabolic panel  3.  Follow-up in 1 year    Thank you again for this referral, please feel free to contact me if you have any questions.    John Paul Cotton MD  [facility]UR[facility], [place]     HISTORY OF PRESENT ILLNESS     Patient is a pleasant 28-year-old female with history of juvenile myoclonic epilepsy, pineal cyst last seen on 2023 who returns for follow-up.  She was pregnant when seen last and delivered a healthy girl.  During her last visit she was continued on Keppra and Keppra level was checked that was therapeutic.  Since her last visit she denies any seizures, myoclonic twitches or unexplained loss of consciousness    Patient was initially evaluated in our clinic in  when she had a seizure when she woke up.  MRI of the brain was normal.  EEG did not show any abnormality.  She experienced some myoclonic  twitches in the past but after she was started on Keppra those twitches subsided.  She was also started on folic acid.  Although EEG was normal she was diagnosed with juvenile myoclonic epilepsy and had remained symptom-free.     PROBLEM LIST   Patient Active Problem List   Diagnosis     Juvenile myoclonic epilepsy (H)     Pineal gland cyst         PAST MEDICAL & SURGICAL HISTORY     Past Medical History:   Patient  has no past medical history on file.    Surgical History:  She  has no past surgical history on file.     SOCIAL HISTORY     Reviewed, and she  reports that she has never smoked. She has never used smokeless tobacco. She reports current alcohol use.     FAMILY HISTORY     Reviewed, and family history includes Cancer in her maternal grandfather; Hypertension in her mother; Uterine Cancer in her maternal grandmother.     ALLERGIES     No Known Allergies      REVIEW OF SYSTEMS     A 12 point review of system was performed and was negative except as outlined in the history of present illness.     HOME MEDICATIONS     Current Outpatient Rx   Medication Sig Dispense Refill     folic acid (FOLVITE) 1 MG tablet TAKE 2 TABLETS BY MOUTH TWICE  DAILY 360 tablet 0     levETIRAcetam (KEPPRA) 500 MG tablet TAKE 1 TABLET BY MOUTH TWICE  DAILY 180 tablet 0         PHYSICAL EXAM     Vital signs  /80   Pulse 68   Wt 69.9 kg (154 lb)   BMI 24.12 kg/m      Weight:   154 lbs 0 oz    Patient is alert and oriented x4 in no acute distress. Vital signs were reviewed and are documented in electronic medical record. Neck was supple, no carotid bruits, thyromegaly, JVD, or lymphadenopathy was noted.   NEUROLOGY EXAM:    Patient s speech was normal with no aphasia or dysarthria. Mentation, and affect were also normal.     Funduscopic exam was normal, with normal cup to disc ratio. Cranial nerves II -XII were intact.     Patient had normal mass, tone and motor strength was 5/5 in all extremities without pronator drift.      Sensation was intact to light touch, pinprick, and vibratory sensation.     Reflexes were 1+ symmetrical with downgoing toes.     No dysmetria noted on FNF or HKS. Romberg was negative.    Gait testing was normal. Able to walk on toes/heels. Tandem walk normal.     PERTINENT DIAGNOSTIC STUDIES     Following studies were reviewed:     (06/16/2015 16:12 CDT MRI Report)  1. Normal Head MRI.  2. No acute infarct, mass, or hemorrhage.  3. Incidental 14 mm pineal cyst    EEG 6/30/15  This is a normal awake and drowsy EEG.      PERTINENT LABS  Following labs were reviewed:  No visits with results within 3 Month(s) from this visit.   Latest known visit with results is:   Lab on 04/25/2023   Component Date Value Ref Range Status     Keppra (Levetiracetam) Level 04/25/2023 13.2  10.0 - 40.0  g/mL Final     Sodium 04/25/2023 140  136 - 145 mmol/L Final     Potassium 04/25/2023 4.1  3.4 - 5.3 mmol/L Final     Chloride 04/25/2023 103  98 - 107 mmol/L Final     Carbon Dioxide (CO2) 04/25/2023 27  22 - 29 mmol/L Final     Anion Gap 04/25/2023 10  7 - 15 mmol/L Final         Total time spent for face to face visit, reviewing labs/imaging studies, counseling and coordination of care was: 30 Minutes spent on the date of the encounter doing chart review, review of outside records, review of test results, interpretation of tests, patient visit, and documentation     The longitudinal plan of care for the diagnosis(es)/condition(s) as documented were addressed during this visit. Due to the added complexity in care, I will continue to support Nina in the subsequent management and with ongoing continuity of care.    This note was dictated using voice recognition software.  Any grammatical or context distortions are unintentional and inherent to the software.    No orders of the defined types were placed in this encounter.     New Prescriptions    No medications on file     Modified Medications    No medications on file                  Again, thank you for allowing me to participate in the care of your patient.        Sincerely,        John Paul Cotton MD

## 2024-04-29 NOTE — NURSING NOTE
"Nina Nam is a 28 year old female who presents for:  Chief Complaint   Patient presents with    Seizures     Seizure -free; No concerns        Initial Vitals:  /80   Pulse 68   Wt 69.9 kg (154 lb)   BMI 24.12 kg/m   Estimated body mass index is 24.12 kg/m  as calculated from the following:    Height as of 4/25/23: 1.702 m (5' 7\").    Weight as of this encounter: 69.9 kg (154 lb).. Body surface area is 1.82 meters squared. BP completed using cuff size: wrist cuff    Rickie Caro  "

## 2024-07-05 DIAGNOSIS — G40.B09 NONINTRACTABLE JUVENILE MYOCLONIC EPILEPSY WITHOUT STATUS EPILEPTICUS (H): ICD-10-CM

## 2024-07-05 RX ORDER — LEVETIRACETAM 500 MG/1
500 TABLET ORAL 2 TIMES DAILY
Qty: 180 TABLET | Refills: 2 | Status: SHIPPED | OUTPATIENT
Start: 2024-07-05

## 2024-07-05 NOTE — TELEPHONE ENCOUNTER
Refill request for: levETIRAcetam (KEPPRA) 500 MG tablet              Directions: Take 1 tablet (500 mg) by mouth 2 times daily      Refill request for: folic acid (FOLVITE) 1 MG tablet     Directions: Take 2 tablets (2 mg) by mouth 2 times daily      LOV: 4/29/24  NOV: 4/29/25    Sending to Dr. Cortes as Dr. Cotton is out of office      90 day supply with 2 refills Medication T'd for review and signature  Heather Tsai CMA on 7/5/2024 at 9:00 AM  [facility]

## 2024-09-22 DIAGNOSIS — G40.B09 NONINTRACTABLE JUVENILE MYOCLONIC EPILEPSY WITHOUT STATUS EPILEPTICUS (H): ICD-10-CM

## 2024-09-23 NOTE — TELEPHONE ENCOUNTER
Refill request for: folic acid (FOLVITE) 1 MG tablet    Directions: TAKE 2 TABLETS BY MOUTH TWICE  DAILY     LOV: 4/29/24  NOV: 4/29/25    90 day supply with 1 refills Medication T'd for review and signature  Heather Tsai CMA on 9/23/2024 at 3:19 PM  [facility]

## 2024-09-24 RX ORDER — FOLIC ACID 1 MG/1
2000 TABLET ORAL 2 TIMES DAILY
Qty: 360 TABLET | Refills: 1 | Status: SHIPPED | OUTPATIENT
Start: 2024-09-24

## 2024-12-01 ENCOUNTER — HEALTH MAINTENANCE LETTER (OUTPATIENT)
Age: 29
End: 2024-12-01

## 2025-04-21 NOTE — PROGRESS NOTES
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  Fortunately she had an uneventful pregnancy.  Her Keppra level was borderline normal but no change was made.  She was on high-dose folic acid and that she does not plan on getting pregnant anymore and is wondering if she can discontinue folic acid.  I have recommended:    1.  Continue Keppra 500 mg twice daily  2.  Discontinue folic acid supplement  3.  Check Keppra level and basic metabolic panel  4.  Follow-up in 1 year    Thank you again for this referral, please feel free to contact me if you have any questions.    John Paul Cotton MD  [facility]     HISTORY OF PRESENT ILLNESS     Patient is a 29-year-old female with juvenile myoclonic epilepsy, pineal cyst who is here for yearly follow-up.  During her last visit she was continued on Keppra 500 mg twice daily and Keppra level was checked and was therapeutic at 17.1.  Since the last visit she got pregnant and was started on folic acid and was told to continue on Keppra..  She delivered a healthy baby on 3/31/2025.  She is not planning on getting pregnant anymore and is wondering if she can discontinue folic acid    Patient was initially evaluated in our clinic in  when she had a seizure when she woke up.  MRI of the brain was normal.  EEG did not show any abnormality.  She experienced some myoclonic twitches in the past but after she was started on  Keppra those twitches subsided.  She was also started on folic acid.  Although EEG was normal she was diagnosed with juvenile myoclonic epilepsy and had remained symptom-free.     PROBLEM LIST   Patient Active Problem List   Diagnosis    Juvenile myoclonic epilepsy (H)    Pineal gland cyst         PAST MEDICAL & SURGICAL HISTORY     Past Medical History:   Patient  has no past medical history on file.    Surgical History:  She  has no past surgical history on file.     SOCIAL HISTORY     Reviewed, and she  reports that she has never smoked. She has never used smokeless tobacco. She reports current alcohol use.     FAMILY HISTORY     Reviewed, and family history includes Cancer in her maternal grandfather; Hypertension in her mother; Uterine Cancer in her maternal grandmother.     ALLERGIES     No Known Allergies      REVIEW OF SYSTEMS     A 12 point review of system was performed and was negative except as outlined in the history of present illness.     HOME MEDICATIONS     Current Outpatient Rx   Medication Sig Dispense Refill    folic acid (FOLVITE) 1 MG tablet TAKE 2 TABLETS BY MOUTH TWICE  DAILY 360 tablet 1    levETIRAcetam (KEPPRA) 500 MG tablet TAKE 1 TABLET BY MOUTH TWICE  DAILY 180 tablet 2    sertraline (ZOLOFT) 50 MG tablet Take 50 mg by mouth daily.           PHYSICAL EXAM     Vital signs  /85 (BP Location: Right arm, Patient Position: Sitting)   Pulse 69   Wt 70.4 kg (155 lb 4.8 oz)   BMI 24.32 kg/m      Weight:   155 lbs 4.8 oz    Patient is alert and oriented x4 in no acute distress. Vital signs were reviewed and are documented in electronic medical record. Neck was supple, no carotid bruits, thyromegaly, JVD, or lymphadenopathy was noted.   NEUROLOGY EXAM:   Patient s speech was normal with no aphasia or dysarthria. Mentation, and affect were also normal.    Funduscopic exam was normal, with normal cup to disc ratio. Cranial nerves II -XII were intact.    Patient had normal mass, tone and  motor strength was 5/5 in all extremities without pronator drift.    Sensation was intact to light touch, pinprick, and vibratory sensation.    Reflexes were 1+ symmetrical with downgoing toes.    No dysmetria noted on FNF or HKS. Romberg was negative.   Gait testing was normal. Able to walk on toes/heels. Tandem walk normal.     PERTINENT DIAGNOSTIC STUDIES     Following studies were reviewed:     (06/16/2015 16:12 CDT MRI Report)  1. Normal Head MRI.  2. No acute infarct, mass, or hemorrhage.  3. Incidental 14 mm pineal cyst    EEG 6/30/15  This is a normal awake and drowsy EEG.      PERTINENT LABS  Following labs were reviewed:  No visits with results within 3 Month(s) from this visit.   Latest known visit with results is:   Lab on 04/29/2024   Component Date Value Ref Range Status    Keppra (Levetiracetam) Level 04/29/2024 17.1  10.0 - 40.0  g/mL Final    Sodium 04/29/2024 141  135 - 145 mmol/L Final    Potassium 04/29/2024 4.3  3.4 - 5.3 mmol/L Final    Chloride 04/29/2024 102  98 - 107 mmol/L Final    Carbon Dioxide (CO2) 04/29/2024 28  22 - 29 mmol/L Final    Anion Gap 04/29/2024 11  7 - 15 mmol/L Final    Urea Nitrogen 04/29/2024 6.8  6.0 - 20.0 mg/dL Final    Creatinine 04/29/2024 0.72  0.51 - 0.95 mg/dL Final    GFR Estimate 04/29/2024 >90  >60 mL/min/1.73m2 Final    Calcium 04/29/2024 9.2  8.6 - 10.0 mg/dL Final    Glucose 04/29/2024 86  70 - 99 mg/dL Final         Total time spent for face to face visit, reviewing labs/imaging studies, counseling and coordination of care was: 30 Minutes spent on the date of the encounter doing chart review, review of outside records, review of test results, interpretation of tests, patient visit, and documentation     The longitudinal plan of care for the diagnosis(es)/condition(s) as documented were addressed during this visit. Due to the added complexity in care, I will continue to support Nina in the subsequent management and with ongoing continuity of care.    This  note was dictated using voice recognition software.  Any grammatical or context distortions are unintentional and inherent to the software.    No orders of the defined types were placed in this encounter.     New Prescriptions    No medications on file     Modified Medications    No medications on file

## 2025-04-29 ENCOUNTER — OFFICE VISIT (OUTPATIENT)
Dept: NEUROLOGY | Facility: CLINIC | Age: 30
End: 2025-04-29
Payer: COMMERCIAL

## 2025-04-29 ENCOUNTER — LAB (OUTPATIENT)
Dept: LAB | Facility: HOSPITAL | Age: 30
End: 2025-04-29
Payer: COMMERCIAL

## 2025-04-29 VITALS
DIASTOLIC BLOOD PRESSURE: 85 MMHG | SYSTOLIC BLOOD PRESSURE: 123 MMHG | HEART RATE: 69 BPM | WEIGHT: 155.3 LBS | BODY MASS INDEX: 24.32 KG/M2

## 2025-04-29 DIAGNOSIS — G40.B09 NONINTRACTABLE JUVENILE MYOCLONIC EPILEPSY WITHOUT STATUS EPILEPTICUS (H): Primary | ICD-10-CM

## 2025-04-29 DIAGNOSIS — E34.8 PINEAL GLAND CYST: ICD-10-CM

## 2025-04-29 DIAGNOSIS — G40.B09 NONINTRACTABLE JUVENILE MYOCLONIC EPILEPSY WITHOUT STATUS EPILEPTICUS (H): ICD-10-CM

## 2025-04-29 LAB
ANION GAP SERPL CALCULATED.3IONS-SCNC: 8 MMOL/L (ref 7–15)
BUN SERPL-MCNC: 12.9 MG/DL (ref 6–20)
CALCIUM SERPL-MCNC: 8.9 MG/DL (ref 8.8–10.4)
CHLORIDE SERPL-SCNC: 103 MMOL/L (ref 98–107)
CREAT SERPL-MCNC: 0.83 MG/DL (ref 0.51–0.95)
EGFRCR SERPLBLD CKD-EPI 2021: >90 ML/MIN/1.73M2
GLUCOSE SERPL-MCNC: 91 MG/DL (ref 70–99)
HCO3 SERPL-SCNC: 28 MMOL/L (ref 22–29)
LEVETIRACETAM SERPL-MCNC: 18.1 ÂΜG/ML (ref 10–40)
POTASSIUM SERPL-SCNC: 4.5 MMOL/L (ref 3.4–5.3)
SODIUM SERPL-SCNC: 139 MMOL/L (ref 135–145)

## 2025-04-29 PROCEDURE — 3074F SYST BP LT 130 MM HG: CPT | Performed by: PSYCHIATRY & NEUROLOGY

## 2025-04-29 PROCEDURE — 80177 DRUG SCRN QUAN LEVETIRACETAM: CPT

## 2025-04-29 PROCEDURE — 3079F DIAST BP 80-89 MM HG: CPT | Performed by: PSYCHIATRY & NEUROLOGY

## 2025-04-29 PROCEDURE — G2211 COMPLEX E/M VISIT ADD ON: HCPCS | Performed by: PSYCHIATRY & NEUROLOGY

## 2025-04-29 PROCEDURE — 36415 COLL VENOUS BLD VENIPUNCTURE: CPT

## 2025-04-29 PROCEDURE — 80048 BASIC METABOLIC PNL TOTAL CA: CPT

## 2025-04-29 PROCEDURE — 99214 OFFICE O/P EST MOD 30 MIN: CPT | Performed by: PSYCHIATRY & NEUROLOGY

## 2025-04-29 RX ORDER — LEVETIRACETAM 500 MG/1
500 TABLET ORAL 2 TIMES DAILY
Qty: 180 TABLET | Refills: 3 | Status: SHIPPED | OUTPATIENT
Start: 2025-04-29

## 2025-04-29 NOTE — LETTER
2025      Nina Nam  [address] [facility] [address]      Dear Colleague,    Thank you for referring your patient, Nina Nam, to the [facility] [place]. Please see a copy of my visit note below.    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  Fortunately she had an uneventful pregnancy.  Her Keppra level was borderline normal but no change was made.  She was on high-dose folic acid and that she does not plan on getting pregnant anymore and is wondering if she can discontinue folic acid.  I have recommended:    1.  Continue Keppra 500 mg twice daily  2.  Discontinue folic acid supplement  3.  Check Keppra level and basic metabolic panel  4.  Follow-up in 1 year    Thank you again for this referral, please feel free to contact me if you have any questions.    John Paul Cotton MD  [facility] NEUROLOGY, [place]     HISTORY OF PRESENT ILLNESS     Patient is a 29-year-old female with juvenile myoclonic epilepsy, pineal cyst who is here for yearly follow-up.  During her last visit she was continued on Keppra 500 mg twice daily and Keppra level was checked and was therapeutic at 17.1.  Since the last visit she got pregnant and was started on folic acid and was told to continue on Keppra..  She delivered a healthy baby on 3/31/2025.  She is not planning on getting pregnant anymore and is wondering if she can discontinue folic  acid    Patient was initially evaluated in our clinic in 2014 when she had a seizure when she woke up.  MRI of the brain was normal.  EEG did not show any abnormality.  She experienced some myoclonic twitches in the past but after she was started on Keppra those twitches subsided.  She was also started on folic acid.  Although EEG was normal she was diagnosed with juvenile myoclonic epilepsy and had remained symptom-free.     PROBLEM LIST   Patient Active Problem List   Diagnosis     Juvenile myoclonic epilepsy (H)     Pineal gland cyst         PAST MEDICAL & SURGICAL HISTORY     Past Medical History:   Patient  has no past medical history on file.    Surgical History:  She  has no past surgical history on file.     SOCIAL HISTORY     Reviewed, and she  reports that she has never smoked. She has never used smokeless tobacco. She reports current alcohol use.     FAMILY HISTORY     Reviewed, and family history includes Cancer in her maternal grandfather; Hypertension in her mother; Uterine Cancer in her maternal grandmother.     ALLERGIES     No Known Allergies      REVIEW OF SYSTEMS     A 12 point review of system was performed and was negative except as outlined in the history of present illness.     HOME MEDICATIONS     Current Outpatient Rx   Medication Sig Dispense Refill     folic acid (FOLVITE) 1 MG tablet TAKE 2 TABLETS BY MOUTH TWICE  DAILY 360 tablet 1     levETIRAcetam (KEPPRA) 500 MG tablet TAKE 1 TABLET BY MOUTH TWICE  DAILY 180 tablet 2     sertraline (ZOLOFT) 50 MG tablet Take 50 mg by mouth daily.           PHYSICAL EXAM     Vital signs  /85 (BP Location: Right arm, Patient Position: Sitting)   Pulse 69   Wt 70.4 kg (155 lb 4.8 oz)   BMI 24.32 kg/m      Weight:   155 lbs 4.8 oz    Patient is alert and oriented x4 in no acute distress. Vital signs were reviewed and are documented in electronic medical record. Neck was supple, no carotid bruits, thyromegaly, JVD, or lymphadenopathy was noted.    NEUROLOGY EXAM:    Patient s speech was normal with no aphasia or dysarthria. Mentation, and affect were also normal.     Funduscopic exam was normal, with normal cup to disc ratio. Cranial nerves II -XII were intact.     Patient had normal mass, tone and motor strength was 5/5 in all extremities without pronator drift.     Sensation was intact to light touch, pinprick, and vibratory sensation.     Reflexes were 1+ symmetrical with downgoing toes.     No dysmetria noted on FNF or HKS. Romberg was negative.    Gait testing was normal. Able to walk on toes/heels. Tandem walk normal.     PERTINENT DIAGNOSTIC STUDIES     Following studies were reviewed:     (06/16/2015 16:12 CDT MRI Report)  1. Normal Head MRI.  2. No acute infarct, mass, or hemorrhage.  3. Incidental 14 mm pineal cyst    EEG 6/30/15  This is a normal awake and drowsy EEG.      PERTINENT LABS  Following labs were reviewed:  No visits with results within 3 Month(s) from this visit.   Latest known visit with results is:   Lab on 04/29/2024   Component Date Value Ref Range Status     Keppra (Levetiracetam) Level 04/29/2024 17.1  10.0 - 40.0  g/mL Final     Sodium 04/29/2024 141  135 - 145 mmol/L Final     Potassium 04/29/2024 4.3  3.4 - 5.3 mmol/L Final     Chloride 04/29/2024 102  98 - 107 mmol/L Final     Carbon Dioxide (CO2) 04/29/2024 28  22 - 29 mmol/L Final     Anion Gap 04/29/2024 11  7 - 15 mmol/L Final     Urea Nitrogen 04/29/2024 6.8  6.0 - 20.0 mg/dL Final     Creatinine 04/29/2024 0.72  0.51 - 0.95 mg/dL Final     GFR Estimate 04/29/2024 >90  >60 mL/min/1.73m2 Final     Calcium 04/29/2024 9.2  8.6 - 10.0 mg/dL Final     Glucose 04/29/2024 86  70 - 99 mg/dL Final         Total time spent for face to face visit, reviewing labs/imaging studies, counseling and coordination of care was: 30 Minutes spent on the date of the encounter doing chart review, review of outside records, review of test results, interpretation of tests, patient visit, and  documentation     The longitudinal plan of care for the diagnosis(es)/condition(s) as documented were addressed during this visit. Due to the added complexity in care, I will continue to support Nina in the subsequent management and with ongoing continuity of care.    This note was dictated using voice recognition software.  Any grammatical or context distortions are unintentional and inherent to the software.    No orders of the defined types were placed in this encounter.     New Prescriptions    No medications on file     Modified Medications    No medications on file                 Again, thank you for allowing me to participate in the care of your patient.        Sincerely,        John Paul Cotton MD    Electronically signed

## 2025-04-29 NOTE — NURSING NOTE
Chief Complaint   Patient presents with    Annual Visit     Follow up Seizure     PATEL Walters on 4/29/2025 at 9:30 AM